# Patient Record
Sex: FEMALE | Race: WHITE | Employment: PART TIME | URBAN - METROPOLITAN AREA
[De-identification: names, ages, dates, MRNs, and addresses within clinical notes are randomized per-mention and may not be internally consistent; named-entity substitution may affect disease eponyms.]

---

## 2017-09-19 PROBLEM — M51.26 HNP (HERNIATED NUCLEUS PULPOSUS), LUMBAR: Status: ACTIVE | Noted: 2017-09-19

## 2022-10-04 ENCOUNTER — OFFICE VISIT (OUTPATIENT)
Dept: CARDIOLOGY CLINIC | Age: 42
End: 2022-10-04
Payer: COMMERCIAL

## 2022-10-04 VITALS
HEIGHT: 63 IN | DIASTOLIC BLOOD PRESSURE: 97 MMHG | HEART RATE: 70 BPM | OXYGEN SATURATION: 100 % | SYSTOLIC BLOOD PRESSURE: 129 MMHG | WEIGHT: 128 LBS | BODY MASS INDEX: 22.68 KG/M2

## 2022-10-04 DIAGNOSIS — Z82.49 FAMILY HISTORY OF HYPERTROPHIC CARDIOMYOPATHY: ICD-10-CM

## 2022-10-04 DIAGNOSIS — R03.0 ELEVATED BLOOD PRESSURE READING: Primary | ICD-10-CM

## 2022-10-04 DIAGNOSIS — R94.31 ABNORMAL EKG: ICD-10-CM

## 2022-10-04 DIAGNOSIS — R00.2 PALPITATION: ICD-10-CM

## 2022-10-04 DIAGNOSIS — Z82.49 FAMILY HISTORY OF PREMATURE CAD: ICD-10-CM

## 2022-10-04 PROCEDURE — 93000 ELECTROCARDIOGRAM COMPLETE: CPT | Performed by: INTERNAL MEDICINE

## 2022-10-04 PROCEDURE — 99204 OFFICE O/P NEW MOD 45 MIN: CPT | Performed by: INTERNAL MEDICINE

## 2022-10-04 RX ORDER — MELATONIN
DAILY
COMMUNITY

## 2022-10-04 RX ORDER — BISMUTH SUBSALICYLATE 262 MG
1 TABLET,CHEWABLE ORAL DAILY
COMMUNITY

## 2022-10-04 RX ORDER — GLUCOSAMINE/CHONDR SU A SOD 750-600 MG
TABLET ORAL
COMMUNITY

## 2022-10-04 NOTE — PROGRESS NOTES
HISTORY OF PRESENT ILLNESS  Natasha Whelan is a 43 y.o. female. 10/4/2022  Patient seen today for new patient evaluation. She is referred here for evaluation of cardiovascular screening. Patient has strong family history of multiple cardiovascular disease. Mother had atrial fibrillation that required ablation subsequently had a stroke  Maternal uncle has hypertrophic cardiomyopathy as well as MI in 45s. He had cardiac denervation done. Patient complain of feeling short of breath at times with sitting down requiring to take deep breath. She also has felt palpitations on and off at times her heart speeds up. Review of Systems   Constitutional:  Negative for chills and fever. HENT:  Negative for nosebleeds. Eyes:  Negative for blurred vision and double vision. Respiratory:  Negative for cough, hemoptysis, sputum production, shortness of breath and wheezing. Cardiovascular:  Positive for palpitations. Negative for chest pain, orthopnea, claudication, leg swelling and PND. Gastrointestinal:  Negative for abdominal pain, heartburn, nausea and vomiting. Musculoskeletal:  Negative for myalgias. Skin:  Negative for rash. Neurological:  Negative for dizziness, weakness and headaches. Endo/Heme/Allergies:  Does not bruise/bleed easily. Family History   Problem Relation Age of Onset    Heart Disease Mother         afib    Diabetes Father     Diabetes Maternal Grandmother        Past Medical History:   Diagnosis Date    Back pain     Sciatica     Stenosis of lumbosacral spine        Past Surgical History:   Procedure Laterality Date    HX  SECTION      x2       Social History     Tobacco Use    Smoking status: Never    Smokeless tobacco: Never   Substance Use Topics    Alcohol use: Yes     Alcohol/week: 3.0 standard drinks     Types: 3 Glasses of wine per week       No Known Allergies    Prior to Admission medications    Medication Sig Start Date End Date Taking?  Authorizing Provider   multivitamin (ONE A DAY) tablet Take 1 Tablet by mouth daily. Yes Provider, Historical   cholecalciferol (Vitamin D3) (1000 Units /25 mcg) tablet Take  by mouth daily. Yes Provider, Historical   Biotin 2,500 mcg cap Take  by mouth. Yes Provider, Historical   FLAXSEED OIL PO Take  by mouth. Yes Provider, Historical   b complex vitamins tablet Take 1 Tab by mouth daily. Yes Provider, Historical   pyridoxine, vitamin B6, (VITAMIN B-6) 100 mg tablet Take 100 mg by mouth daily. Yes Provider, Historical   cyanocobalamin (VITAMIN B-12) 1,000 mcg sublingual tablet Take 1,000 mcg by mouth daily. Yes Provider, Historical   oxyCODONE-acetaminophen (PERCOCET) 5-325 mg per tablet Take 1-2 Tabs by mouth every four (4) hours as needed. Max Daily Amount: 12 Tabs. Patient not taking: Reported on 10/4/2022 9/20/17   Juana Reinoso PA-C         Visit Vitals  BP (!) 129/97 (BP 1 Location: Right arm, BP Patient Position: Sitting)   Pulse 70   Ht 5' 3\" (1.6 m)   Wt 58.1 kg (128 lb)   SpO2 100%   BMI 22.67 kg/m²       Physical Exam  Constitutional:       Appearance: She is well-developed. HENT:      Head: Normocephalic and atraumatic. Eyes:      Conjunctiva/sclera: Conjunctivae normal.   Neck:      Thyroid: No thyromegaly. Vascular: No JVD. Trachea: No tracheal deviation. Cardiovascular:      Rate and Rhythm: Normal rate and regular rhythm. Chest Wall: PMI is not displaced. Pulses: No decreased pulses. Heart sounds: No murmur heard. No gallop. No S3 sounds. Pulmonary:      Effort: No respiratory distress. Breath sounds: No wheezing or rales. Chest:      Chest wall: No tenderness. Abdominal:      Palpations: Abdomen is soft. Tenderness: There is no abdominal tenderness. Musculoskeletal:      Cervical back: Neck supple. Skin:     General: Skin is warm. Neurological:      Mental Status: She is alert and oriented to person, place, and time.      Ms. Johnson Luciana has a reminder for a \"due or due soon\" health maintenance. I have asked that she contact her primary care provider for follow-up on this health maintenance. No flowsheet data found. I have personally reviewed patient's records available from hospital and other providers and incorporated findings in patient care. Provider notes, labs, EKG, chest x-ray  I have personally reviewed patients ekg done at other facility. 2017 sinus rhythm possible septal MI    Assessment         ICD-10-CM ICD-9-CM    1. Elevated blood pressure reading  R03.0 796.2 ECHO ADULT COMPLETE      CT HEART W/O CONT WITH CALCIUM      CARDIAC EVENT MONITOR    Monitor the home reading and decide on treatment      2. Palpitation  R00.2 785.1     Recurrent episodes monitor for arrhythmia      3. Family history of premature CAD  Z82.49 V17.3 AMB POC EKG ROUTINE W/ 12 LEADS, INTER & REP      ECHO ADULT COMPLETE      CT HEART W/O CONT WITH CALCIUM      CARDIAC EVENT MONITOR    Check labs for lipid profile and coronary calcium score      4. Abnormal EKG  R94.31 794.31 ECHO ADULT COMPLETE      CT HEART W/O CONT WITH CALCIUM      CARDIAC EVENT MONITOR    Multiple EKG reported as septal MI. Follow-up with echo      5. Family history of hypertrophic cardiomyopathy  Z82.49 V17.49 ECHO ADULT COMPLETE      CT HEART W/O CONT WITH CALCIUM      CARDIAC EVENT MONITOR    Evaluate with echo to evaluate for septal hypertrophy      10/2022  Seen for cardiovascular screening. Family history of hypertrophic cardiomyopathy, premature CAD as well as cardiac arrhythmia. Patient's palpitation. Set up for event monitor, calcium score and echo. Check lab from PCP where she was noted to have high calcium. Recent increase in blood pressure will monitor and decide on treatment. Further plans based on above    There are no discontinued medications.     Orders Placed This Encounter    CT HEART W/O CONT WITH CALCIUM     Standing Status:   Future     Standing Expiration Date:   11/4/2023     Order Specific Question:   Is Patient Pregnant? Answer:   No    AMB POC EKG ROUTINE W/ 12 LEADS, INTER & REP     Order Specific Question:   Reason for Exam:     Answer:   routine    ECHO ADULT COMPLETE     Standing Status:   Future     Standing Expiration Date:   10/4/2023     Order Specific Question:   Contrast Enhancement (Bubble Study, Definity, Optison) may be used if criteria listed in established evidence-based protocol has been identified. Answer:   Yes     Order Specific Question:   Is Patient Pregnant? Answer:   No       Follow-up and Dispositions    Return for F/u after tests, Follow-up with Ehsan Rodriguez, get labs from pcp.

## 2022-10-11 ENCOUNTER — HOSPITAL ENCOUNTER (OUTPATIENT)
Dept: CT IMAGING | Age: 42
Discharge: HOME | End: 2022-10-11
Attending: INTERNAL MEDICINE

## 2022-10-11 ENCOUNTER — HOSPITAL ENCOUNTER (EMERGENCY)
Age: 42
Discharge: STILL ADMITTED OR ADMINISTRATIVE | End: 2022-10-11

## 2022-10-11 DIAGNOSIS — Z82.49 FAMILY HISTORY OF PREMATURE CAD: ICD-10-CM

## 2022-10-11 DIAGNOSIS — R94.31 ABNORMAL EKG: ICD-10-CM

## 2022-10-11 DIAGNOSIS — R03.0 ELEVATED BLOOD PRESSURE READING: ICD-10-CM

## 2022-10-11 DIAGNOSIS — Z82.49 FAMILY HISTORY OF HYPERTROPHIC CARDIOMYOPATHY: ICD-10-CM

## 2022-10-17 ENCOUNTER — HOSPITAL ENCOUNTER (OUTPATIENT)
Dept: CT IMAGING | Age: 42
Discharge: HOME | End: 2022-10-17
Attending: INTERNAL MEDICINE
Payer: SELF-PAY

## 2022-10-17 PROCEDURE — 75571 CT HRT W/O DYE W/CA TEST: CPT

## 2022-10-25 DIAGNOSIS — Z82.49 FAMILY HISTORY OF HYPERTROPHIC CARDIOMYOPATHY: ICD-10-CM

## 2022-10-25 DIAGNOSIS — R94.31 ABNORMAL EKG: ICD-10-CM

## 2022-10-25 DIAGNOSIS — Z82.49 FAMILY HISTORY OF PREMATURE CAD: ICD-10-CM

## 2022-10-25 DIAGNOSIS — R03.0 ELEVATED BLOOD PRESSURE READING: ICD-10-CM

## 2022-11-01 ENCOUNTER — OFFICE VISIT (OUTPATIENT)
Dept: CARDIOLOGY CLINIC | Age: 42
End: 2022-11-01
Payer: COMMERCIAL

## 2022-11-01 VITALS
WEIGHT: 129 LBS | OXYGEN SATURATION: 98 % | BODY MASS INDEX: 22.86 KG/M2 | HEIGHT: 63 IN | HEART RATE: 71 BPM | DIASTOLIC BLOOD PRESSURE: 62 MMHG | SYSTOLIC BLOOD PRESSURE: 116 MMHG

## 2022-11-01 DIAGNOSIS — Z82.49 FAMILY HISTORY OF PREMATURE CAD: Primary | ICD-10-CM

## 2022-11-01 DIAGNOSIS — R00.2 PALPITATION: ICD-10-CM

## 2022-11-01 DIAGNOSIS — Z82.49 FAMILY HISTORY OF HYPERTROPHIC CARDIOMYOPATHY: ICD-10-CM

## 2022-11-01 DIAGNOSIS — R00.0 SINUS TACHYCARDIA: ICD-10-CM

## 2022-11-01 PROCEDURE — 99214 OFFICE O/P EST MOD 30 MIN: CPT | Performed by: NURSE PRACTITIONER

## 2022-11-01 NOTE — PROGRESS NOTES
1. Have you been to the ER, urgent care clinic since your last visit? Hospitalized since your last visit?  no       2. Have you seen or consulted any other health care providers outside of the [address] [place] [address] since your last visit? Include any pap smears or colon screening.          no

## 2022-11-01 NOTE — PROGRESS NOTES
HISTORY OF PRESENT ILLNESS  Amy Damico is a 43 y.o. female. 10/4/2022  Patient seen today for new patient evaluation. She is referred here for evaluation of cardiovascular screening. Patient has strong family history of multiple cardiovascular disease. Mother had atrial fibrillation that required ablation subsequently had a stroke  Maternal uncle has hypertrophic cardiomyopathy as well as MI in 45s. He had cardiac denervation done. Patient complain of feeling short of breath at times with sitting down requiring to take deep breath. She also has felt palpitations on and off at times her heart speeds up.  2022  Patient seen in follow-up for episodes of shortness of breath and palpitations at times. She denies chest pain, or edema. Follow-up  Associated symptoms include shortness of breath. Pertinent negatives include no chest pain, no abdominal pain and no headaches. Review of Systems   Constitutional:  Negative for chills and fever. HENT:  Negative for nosebleeds. Eyes:  Negative for blurred vision and double vision. Respiratory:  Positive for shortness of breath. Negative for cough, hemoptysis, sputum production and wheezing. Cardiovascular:  Positive for palpitations. Negative for chest pain, orthopnea, claudication, leg swelling and PND. Gastrointestinal:  Negative for abdominal pain, heartburn, nausea and vomiting. Musculoskeletal:  Negative for myalgias. Skin:  Negative for rash. Neurological:  Negative for dizziness, weakness and headaches. Endo/Heme/Allergies:  Does not bruise/bleed easily.    Family History   Problem Relation Age of Onset    Heart Disease Mother         afib    Diabetes Father     Diabetes Maternal Grandmother        Past Medical History:   Diagnosis Date    Back pain     Sciatica     Stenosis of lumbosacral spine        Past Surgical History:   Procedure Laterality Date    HX  SECTION      x2       Social History     Tobacco Use Smoking status: Never    Smokeless tobacco: Never   Substance Use Topics    Alcohol use: Yes     Alcohol/week: 3.0 standard drinks     Types: 3 Glasses of wine per week       No Known Allergies    Prior to Admission medications    Medication Sig Start Date End Date Taking? Authorizing Provider   multivitamin (ONE A DAY) tablet Take 1 Tablet by mouth daily. Yes Provider, Historical   cholecalciferol (VITAMIN D3) (1000 Units /25 mcg) tablet Take  by mouth daily. Yes Provider, Historical   Biotin 2,500 mcg cap Take  by mouth. Yes Provider, Historical   FLAXSEED OIL PO Take  by mouth. Yes Provider, Historical   b complex vitamins tablet Take 1 Tab by mouth daily. Yes Provider, Historical   pyridoxine, vitamin B6, (VITAMIN B-6) 100 mg tablet Take 100 mg by mouth daily. Yes Provider, Historical   cyanocobalamin (VITAMIN B-12) 1,000 mcg sublingual tablet Take 1,000 mcg by mouth daily. Yes Provider, Historical   oxyCODONE-acetaminophen (PERCOCET) 5-325 mg per tablet Take 1-2 Tabs by mouth every four (4) hours as needed. Max Daily Amount: 12 Tabs. Patient not taking: Reported on 10/4/2022 9/20/17 11/1/22  Belkys Combs PA-C         Visit Vitals  /62 (BP 1 Location: Left upper arm, BP Patient Position: Sitting)   Pulse 71   Ht 5' 3\" (1.6 m)   Wt 58.5 kg (129 lb)   SpO2 98%   BMI 22.85 kg/m²       Physical Exam  Vitals and nursing note reviewed. Constitutional:       Appearance: She is well-developed. HENT:      Head: Normocephalic and atraumatic. Eyes:      Conjunctiva/sclera: Conjunctivae normal.   Neck:      Thyroid: No thyromegaly. Vascular: No JVD. Trachea: No tracheal deviation. Cardiovascular:      Rate and Rhythm: Normal rate and regular rhythm. Chest Wall: PMI is not displaced. Pulses: No decreased pulses. Heart sounds: No murmur heard. No gallop. No S3 sounds. Pulmonary:      Effort: No respiratory distress. Breath sounds: No wheezing or rales. Chest:      Chest wall: No tenderness. Abdominal:      Palpations: Abdomen is soft. Tenderness: There is no abdominal tenderness. Musculoskeletal:      Cervical back: Neck supple. Right lower leg: No edema. Left lower leg: No edema. Skin:     General: Skin is warm. Neurological:      Mental Status: She is alert and oriented to person, place, and time. Ms. Maxx Jansen has a reminder for a \"due or due soon\" health maintenance. I have asked that she contact her primary care provider for follow-up on this health maintenance. CTA calcium score  10/2022  Partially imaged lung parenchyma: Borderline centrilobular emphysema present. No  focal lesion seen. Mediastinum: No adenopathy on imaged portion. Pleural Space And Chest Wall: Unremarkable. Imaged Upper Abdomen: Benign cyst identified at the lateral aspect of segment 8,  measuring 1.8 x 2.1 cm. Osseous Structures: Unremarkable. The final Radiology portion of this exam was provided by Dr. Doris Perez on  10/17/2022 2:11 PM.     The final Cardiology report will follow. The patient underwent a limited noncontrast CT scan of her chest with cardiac  gating to evaluate for coronary arterial calcification. Using the Agatston  method the coronary calcium score was calculated. There are no coronary  calcifications present. Coronary calcium score calculated to be 0. IMPRESSION. Coronary calcium score 0. Event Monitor  10/2022  SR   Episode of sinus tach to 168  No significant arrhythmias, < 1% PAC PVD    Echo  10/2022  Interpretation Summary         Left Ventricle: Normal left ventricular systolic function with a visually estimated EF of 55 - 60%. Left ventricle size is normal. Normal wall thickness. Normal wall motion. Normal diastolic function. Pulmonary Arteries: Pulmonary hypertension not present. The estimated PASP is 21 mmHg. Aorta: Mildly dilated aortic root. Ao Root diameter is 3.9 cm.  Mildly dilated ascending aorta. Ao Ascending diameter is 3.9 cm. Comparison Study Information    Prior Study    There is no prior study available for comparison. No flowsheet data found. I have personally reviewed patient's records available from hospital and other providers and incorporated findings in patient care. Provider notes, labs, EKG, chest x-ray  I have personally reviewed patients ekg done at other facility. 2017 sinus rhythm possible septal MI    Assessment         ICD-10-CM ICD-9-CM    1. Family history of premature CAD  Z82.49 V17.3     Calcium score 0  obtain lipid panel      2. Palpitation  R00.2 785.1     Event monitor with       3. Family history of hypertrophic cardiomyopathy  Z82.49 V17.49       4. Sinus tachycardia  R00.0 427.89     Episodes seen on event monitor. Discussed starting low dose beta-blocker patient prefers to monitor at this time; as discussed vagal maneuvers      10/2022  Seen for cardiovascular screening. Family history of hypertrophic cardiomyopathy, premature CAD as well as cardiac arrhythmia. Patient's palpitation. Set up for event monitor, calcium score and echo. Check lab from PCP where she was noted to have high calcium. Recent increase in blood pressure will monitor and decide on treatment. Further plans based on above  11/2022  Seen in follow-up for episodes of palpitations with shortness of breath. Testing reviewed and discussed with patient Echo with normal LV function, mildly dilated aortic root, coronary calcium score 0,  Borderline centrilobular emphysema present -advised to follow-up with PCP. Event monitor sinus rhythm with 1 episode of sinus tach at 168 no significant arrhythmias noted. Discussed starting low-dose beta-blocker, patient would like to monitor at this time discussed vagal maneuvers.     Medications Discontinued During This Encounter   Medication Reason    oxyCODONE-acetaminophen (PERCOCET) 5-325 mg per tablet Therapy Completed       No orders of the defined types were placed in this encounter. Follow-up and Dispositions    Return in about 6 months (around 5/1/2023) for Follow up with Dr. Howie Dangelo.